# Patient Record
(demographics unavailable — no encounter records)

---

## 2025-05-27 NOTE — PHYSICAL EXAM
[Normocephalic] : normocephalic [No Supraclavicular Adenopathy] : no supraclavicular adenopathy [No Cervical Adenopathy] : no cervical adenopathy [Examined in the supine and seated position] : examined in the supine and seated position [No dominant masses] : no dominant masses in right breast  [No dominant masses] : no dominant masses left breast [No Nipple Retraction] : no left nipple retraction [No Nipple Discharge] : no left nipple discharge [No Axillary Lymphadenopathy] : no left axillary lymphadenopathy [No Rashes] : no rashes [No Ulceration] : no ulceration [de-identified] : Nl respirations

## 2025-05-27 NOTE — HISTORY OF PRESENT ILLNESS
[FreeTextEntry1] : Jo is 51 year old female here with right benign breast biopsy.  No fam hx of breast cancer and ovarian cancer   Her imaging is as follows: 4/10/25 bl dx mammo-->[id number] Dense breasts right breast - circumscribed rounded asymmetry noted mid depth slightly laterally that may correspond to sono finding of ill defined hypoechoic nodule 5mm 8-9n3 --> us guided bx   5/5/25 right breast US guided bx (ribbon) benign breast tissue with stromal fibrosis  concorant rec: six month follow up right breast US  rec: MRI recommendation due to right nipple textural change and ra fullness without mammo and sono correlate   She notes changes to her right nipple starting years ago. Notes it inverts.

## 2025-05-27 NOTE — PHYSICAL EXAM
[Normocephalic] : normocephalic [No Supraclavicular Adenopathy] : no supraclavicular adenopathy [No Cervical Adenopathy] : no cervical adenopathy [Examined in the supine and seated position] : examined in the supine and seated position [No dominant masses] : no dominant masses in right breast  [No dominant masses] : no dominant masses left breast [No Nipple Retraction] : no left nipple retraction [No Nipple Discharge] : no left nipple discharge [No Axillary Lymphadenopathy] : no left axillary lymphadenopathy [No Rashes] : no rashes [No Ulceration] : no ulceration [de-identified] : Nl respirations

## 2025-05-27 NOTE — ASSESSMENT
[FreeTextEntry1] : Jo is 51 year old female here with right benign breast biopsy.  Her imaging is as follows: 4/10/25 bl dx mammo-->birads4 Dense breasts right breast - circumscribed rounded asymmetry noted mid depth slightly laterally that may correspond to sono finding of ill defined hypoechoic nodule 5mm 8-9n3 --> us guided bx   5/5/25 right breast US guided bx (ribbon) benign breast tissue with stromal fibrosis  concorant rec: six month follow up right breast US  rec: MRI recommendation due to right nipple textural change and ra fullness without mammo and sono correlate   She notes changes to her right nipple starting years ago. Notes it inverts.   We discussed her biopsy and pathology. We discussed it was benign and a 6 month follow up Us was recommended.  We also discussed an MRI as it was recommended. The use of MRIs have not been shown to prolong survival, however out of 1000 women screened, an additional 14-15 cancers will be identified. The use of MRIs, has, however, been shown to increase the number of procedures and additional imaging because although it is a very sensitive test, it is not as specific. I also discussed that some patients could have an allergic reaction to gadolinium, or affect the kidneys, and gadolinium has been found to be deposited in the brain of patients who undergo many MRIs in their lifetime, although the effects are currently understudy. She understands and wishes to proceed with an MRI.  All questions and concerns were answered in detail.  Patient is for MRI now. If benign, she will be for right US in 11/2025. She is for follow up after imaging, pending any interval changes.  Total time spent on encounter was greater than 45 minutes , which included face to face time with the patient, performing an exam, reviewing previous medical records, reviewing current imaging/ pathology, documenting in patient record and coordinating care/imaging. Greater than 50% of the encounter was spent on counseling and coordination of her breast issue.

## 2025-07-14 NOTE — PHYSICAL EXAM
[Chaperoned Physical Exam] : A chaperone was present in the examining room during all aspects of the physical examination. [MA] : MA [FreeTextEntry2] : MARLENI Jama [Appropriately responsive] : appropriately responsive [Alert] : alert [No Acute Distress] : no acute distress [Oriented x3] : oriented x3 [Examination Of The Breasts] : a normal appearance [No Discharge] : no discharge [No Masses] : no breast masses were palpable [Labia Majora] : normal [Labia Minora] : normal [Normal] : normal [Uterine Adnexae] : normal

## 2025-07-14 NOTE — HISTORY OF PRESENT ILLNESS
[FreeTextEntry1] : annual visit no complaints [postmenopausal] : postmenopausal [LMPDate] : 2/2023 [Currently In Menopause] : currently in menopause [Post-Menopause, No Sxs] : post-menopausal, currently without symptoms [Menopause Age: ____] : age at menopause was [unfilled] [No] : Patient does not have concerns regarding sex [Currently Active] : currently active